# Patient Record
Sex: FEMALE | Race: WHITE | NOT HISPANIC OR LATINO | Employment: FULL TIME | URBAN - METROPOLITAN AREA
[De-identification: names, ages, dates, MRNs, and addresses within clinical notes are randomized per-mention and may not be internally consistent; named-entity substitution may affect disease eponyms.]

---

## 2017-03-07 ENCOUNTER — OFFICE VISIT (OUTPATIENT)
Dept: INTERNAL MEDICINE | Facility: CLINIC | Age: 32
End: 2017-03-07
Attending: INTERNAL MEDICINE
Payer: MEDICAID

## 2017-03-07 VITALS
DIASTOLIC BLOOD PRESSURE: 70 MMHG | HEIGHT: 65 IN | HEART RATE: 86 BPM | BODY MASS INDEX: 23.43 KG/M2 | SYSTOLIC BLOOD PRESSURE: 105 MMHG | TEMPERATURE: 99 F | WEIGHT: 140.63 LBS

## 2017-03-07 DIAGNOSIS — J06.9 VIRAL URI: Primary | ICD-10-CM

## 2017-03-07 DIAGNOSIS — Z3A.14 14 WEEKS GESTATION OF PREGNANCY: ICD-10-CM

## 2017-03-07 PROCEDURE — 99999 PR PBB SHADOW E&M-NEW PATIENT-LVL III: CPT | Mod: PBBFAC,,, | Performed by: STUDENT IN AN ORGANIZED HEALTH CARE EDUCATION/TRAINING PROGRAM

## 2017-03-07 PROCEDURE — 99203 OFFICE O/P NEW LOW 30 MIN: CPT | Mod: S$PBB,,, | Performed by: STUDENT IN AN ORGANIZED HEALTH CARE EDUCATION/TRAINING PROGRAM

## 2017-03-07 PROCEDURE — 99203 OFFICE O/P NEW LOW 30 MIN: CPT | Mod: PBBFAC,PO | Performed by: STUDENT IN AN ORGANIZED HEALTH CARE EDUCATION/TRAINING PROGRAM

## 2017-03-07 NOTE — PATIENT INSTRUCTIONS
Please take nasocort/flonase as instructed. Please inform your OB about medications for nausea.   Viral Upper Respiratory Illness (Adult)  You have a viral upper respiratory illness (URI), which is another term for the common cold. This illness is contagious during the first few days. It is spread through the air by coughing and sneezing. It may also be spread by direct contact (touching the sick person and then touching your own eyes, nose, or mouth). Frequent handwashing will decrease risk of spread. Most viral illnesses go away within 7 to 10 days with rest and simple home remedies. Sometimes the illness may last for several weeks. Antibiotics will not kill a virus, and they are generally not prescribed for this condition.    Home care  · If symptoms are severe, rest at home for the first 2 to 3 days. When you resume activity, don't let yourself get too tired.  · Avoid being exposed to cigarette smoke (yours or others).  · You may use acetaminophen or ibuprofen to control pain and fever, unless another medicine was prescribed. (Note: If you have chronic liver or kidney disease, have ever had a stomach ulcer or gastrointestinal bleeding, or are taking blood-thinning medicines, talk with your healthcare provider before using these medicines.) Aspirin should never be given to anyone under 18 years of age who is ill with a viral infection or fever. It may cause severe liver or brain damage.  · Your appetite may be poor, so a light diet is fine. Avoid dehydration by drinking 6 to 8 glasses of fluids per day (water, soft drinks, juices, tea, or soup). Extra fluids will help loosen secretions in the nose and lungs.  · Over-the-counter cold medicines will not shorten the length of time youre sick, but they may be helpful for the following symptoms: cough, sore throat, and nasal and sinus congestion. (Note: Do not use decongestants if you have high blood pressure.)  Follow-up care  Follow up with your healthcare  provider, or as advised.  When to seek medical advice  Call your healthcare provider right away if any of these occur:  · Cough with lots of colored sputum (mucus)  · Severe headache; face, neck, or ear pain  · Difficulty swallowing due to throat pain  · Fever of 100.4°F (38°C)  Call 911, or get immediate medical care  Call emergency services right away if any of these occur:  · Chest pain, shortness of breath, wheezing, or difficulty breathing  · Coughing up blood  · Inability to swallow due to throat pain  Date Last Reviewed: 9/13/2015  © 5970-8107 [employer]. [address] [place] [address]. All rights reserved. This information is not intended as a substitute for professional medical care. Always follow your healthcare professional's instructions.

## 2017-03-07 NOTE — PROGRESS NOTES
"Subjective:       Patient ID: Portia Gould is a 32 y.o. female.    Chief Complaint: Sinus Problem and Nausea    HPI       33 yo pregnant female with asthma  and hx of MVP who is here for urgent care visit for "sinus problem" and nausea.     Complains of 1 week hx of sinus pressure and tinnitus. States she has congestion when sleeping and sinus pressure that has not improved. States that her boyfriend has the same symptoms. Reports taking claritin D (for about 5 days) and a "sinus rinse" that did not improve her symptoms. Reports subjective "low grade" fevers at night and nausea (~14 weeks since finding out she was pregnant). Denies CP, SOB, abdominal pain, vomiting, myalgias. She also notes a ~1.5wk hx of R sided tinnitus that would last 15 seconds. Denies otalgia, discharge, dizziness, vertigo, or hearing loss.      Last saw a MD 6 months ago for sinusitis, who prescribed an antibiotic and nasal spray. Patient states that her symptoms gradually improved.     Asthma well controlled, has not use inhaler in months.     Review of Systems   Constitutional: Positive for fever (subjective low grade fevers). Negative for chills.   HENT: Positive for congestion, rhinorrhea, sinus pressure, sneezing and tinnitus (R ear ). Negative for dental problem, ear discharge, ear pain, hearing loss, postnasal drip, sore throat and trouble swallowing.    Eyes: Positive for itching. Negative for pain and visual disturbance.   Respiratory: Negative for cough and shortness of breath.    Cardiovascular: Negative for chest pain and palpitations.   Gastrointestinal: Positive for nausea. Negative for abdominal distention, blood in stool, diarrhea and vomiting.   Genitourinary: Negative for dysuria.   Musculoskeletal: Negative for arthralgias, back pain and myalgias.   Neurological: Positive for headaches (sinus). Negative for dizziness, weakness, light-headedness and numbness.       Objective:       Vitals:    03/07/17 1553   BP: 105/70 "   Pulse: 86   Temp: 98.7       Physical Exam   Constitutional: She is oriented to person, place, and time. She appears well-developed and well-nourished. No distress.   HENT:   Right Ear: Hearing, external ear and ear canal normal. No drainage. Tympanic membrane is not injected, not erythematous and not bulging. No hemotympanum.   Left Ear: Hearing, external ear and ear canal normal. No drainage. Tympanic membrane is not injected, not erythematous and not bulging. No hemotympanum.   Mouth/Throat: Oropharynx is clear and moist. No oropharyngeal exudate.   Frontal and maxillary tenderness with palpation   Eyes: Conjunctivae and EOM are normal. No scleral icterus.   Neck: No thyromegaly present.   Cardiovascular: Normal rate, regular rhythm and normal heart sounds.    Pulmonary/Chest: Effort normal and breath sounds normal. She has no wheezes.   Abdominal: Soft. Bowel sounds are normal. There is no tenderness.   Lymphadenopathy:     She has no cervical adenopathy.   Neurological: She is alert and oriented to person, place, and time.   Skin: Skin is warm and dry.       Assessment:       1. Viral URI    2. 14 weeks gestation of pregnancy        Plan:       31 yo pregnant female with asthma who presents to clinic for UC for sinusitis and nausea. Discussed most common etiology of sinusitis is viral. Recommended OTC flonase/nasocort for congestion and decongestant for sinus pressure (Claritin D). Instructed patient to discuss with her OB (seeing tomorrow) on what to take for morning sickness. Pt desires to transfer care to [facility] for OB needs.  Discussed with patient s/s of when to return to clinic/ED that includes but not limit to high fever, hearing loss, worsening symptoms, CP, and SOB. Pt voiced understanding.     Viral URI   -flonase/nasocort for congestion   -continue claritin D   -increase fluids and wash hands regularly    14 weeks gestation of pregnancy  -     Ambulatory Referral to Obstetrics /  Gynecology  - Pt desired to transfer care to [facility] for her OB needs.  - Pt stated she is seeing her OB tomorrow and will discuss what she can take for her nausea.    D/W Dr. Valladares.    RTC PRN.    Mariangel Bronson PGY2

## 2017-03-07 NOTE — MR AVS SNAPSHOT
[facility]  [address]  [facility] [address]  Phone: [phone]  Fax: [phone]                  Portia Gould   3/7/2017 2:45 PM   Office Visit    Description:  Female : 1985   Provider:  NYA Albarran   Department:  [facility]           Reason for Visit     Sinus Problem     Nausea           Diagnoses this Visit        Comments    Viral URI    -  Primary     14 weeks gestation of pregnancy                To Do List           Goals (5 Years of Data)     None      Follow-Up and Disposition     Return if symptoms worsen or fail to improve.      [facility]     [facility] -  Assistance  Registered nurses in the [facility] provide clinical advisement, health education, appointment booking, and other advisory services.  Call for this free service at [phone].             Medications           Message regarding Medications     Verify the changes and/or additions to your medication regime listed below are the same as discussed with your clinician today.  If any of these changes or additions are incorrect, please notify your healthcare provider.        STOP taking these medications     albuterol 90 mcg/actuation inhaler Inhale 1-2 puffs into the lungs every 6 (six) hours as needed for Wheezing.    fluticasone (FLONASE) 50 mcg/actuation nasal spray 2 sprays by Each Nare route once daily.    norethindrone-e.estradiol-iron (LO LOESTRIN FE) 1 mg-10 mcg(24) /10 mcg (2) Tab Take 1 tablet by mouth once daily.           Verify that the below list of medications is an accurate representation of the medications you are currently taking.  If none reported, the list may be blank. If incorrect, please contact your healthcare provider. Carry this list with you in case of emergency.           Current Medications            Clinical Reference Information           Your Vitals Were     BP Pulse Temp Height Weight BMI  "   97/62 (BP Location: Left arm, Patient Position: Sitting, BP Method: Automatic) 86 98.7 °F (37.1 °C) (Oral) 5' 5" (1.651 m) 63.8 kg (140 lb 10.5 oz) 23.41 kg/m2      Blood Pressure          Most Recent Value    BP  97/62      Allergies as of 3/7/2017     Doxycycline      Immunizations Administered on Date of Encounter - 3/7/2017     None      Orders Placed During Today's Visit      Normal Orders This Visit    Ambulatory Referral to Obstetrics / Gynecology       Instructions    Please take nasocort/flonase as instructed. Please inform your OB about medications for nausea.   Viral Upper Respiratory Illness (Adult)  You have a viral upper respiratory illness (URI), which is another term for the common cold. This illness is contagious during the first few days. It is spread through the air by coughing and sneezing. It may also be spread by direct contact (touching the sick person and then touching your own eyes, nose, or mouth). Frequent handwashing will decrease risk of spread. Most viral illnesses go away within 7 to 10 days with rest and simple home remedies. Sometimes the illness may last for several weeks. Antibiotics will not kill a virus, and they are generally not prescribed for this condition.    Home care  · If symptoms are severe, rest at home for the first 2 to 3 days. When you resume activity, don't let yourself get too tired.  · Avoid being exposed to cigarette smoke (yours or others).  · You may use acetaminophen or ibuprofen to control pain and fever, unless another medicine was prescribed. (Note: If you have chronic liver or kidney disease, have ever had a stomach ulcer or gastrointestinal bleeding, or are taking blood-thinning medicines, talk with your healthcare provider before using these medicines.) Aspirin should never be given to anyone under 18 years of age who is ill with a viral infection or fever. It may cause severe liver or brain damage.  · Your appetite may be poor, so a light diet is " fine. Avoid dehydration by drinking 6 to 8 glasses of fluids per day (water, soft drinks, juices, tea, or soup). Extra fluids will help loosen secretions in the nose and lungs.  · Over-the-counter cold medicines will not shorten the length of time youre sick, but they may be helpful for the following symptoms: cough, sore throat, and nasal and sinus congestion. (Note: Do not use decongestants if you have high blood pressure.)  Follow-up care  Follow up with your healthcare provider, or as advised.  When to seek medical advice  Call your healthcare provider right away if any of these occur:  · Cough with lots of colored sputum (mucus)  · Severe headache; face, neck, or ear pain  · Difficulty swallowing due to throat pain  · Fever of 100.4°F (38°C)  Call 911, or get immediate medical care  Call emergency services right away if any of these occur:  · Chest pain, shortness of breath, wheezing, or difficulty breathing  · Coughing up blood  · Inability to swallow due to throat pain  Date Last Reviewed: 9/13/2015  © 7071-2405 [employer]. [address] [place] [address]. All rights reserved. This information is not intended as a substitute for professional medical care. Always follow your healthcare professional's instructions.             Language Assistance Services     ATTENTION: Language assistance services are available, free of charge. Please call [phone].      ATENCIÓN: Si habla español, tiene a guerrero disposición servicios gratuitos de asistencia lingüística. Llame al [phone].     CHÚ Ý: N?u b?n nói Ti?ng Vi?t, có các d?ch v? h? tr? ngôn ng? mi?n phí dành cho b?n. G?i s? [phone].         [facility] complies with applicable Federal civil rights laws and does not discriminate on the basis of race, color, national origin, age, disability, or sex.

## 2017-03-08 NOTE — PROGRESS NOTES
I have been physically present during the critical or key portions of the service furnished by the resident and I have participated in the management of the patient.  Dr. Valladares

## 2019-07-03 ENCOUNTER — OFFICE VISIT (OUTPATIENT)
Dept: URGENT CARE | Facility: CLINIC | Age: 34
End: 2019-07-03
Payer: MEDICAID

## 2019-07-03 VITALS
SYSTOLIC BLOOD PRESSURE: 105 MMHG | OXYGEN SATURATION: 96 % | HEART RATE: 96 BPM | RESPIRATION RATE: 18 BRPM | BODY MASS INDEX: 23.32 KG/M2 | TEMPERATURE: 98 F | HEIGHT: 65 IN | DIASTOLIC BLOOD PRESSURE: 64 MMHG | WEIGHT: 140 LBS

## 2019-07-03 DIAGNOSIS — J02.9 SORE THROAT: ICD-10-CM

## 2019-07-03 DIAGNOSIS — J01.40 ACUTE NON-RECURRENT PANSINUSITIS: Primary | ICD-10-CM

## 2019-07-03 LAB
CTP QC/QA: YES
S PYO RRNA THROAT QL PROBE: NEGATIVE

## 2019-07-03 PROCEDURE — 99214 OFFICE O/P EST MOD 30 MIN: CPT | Mod: 25,S$GLB,, | Performed by: PHYSICIAN ASSISTANT

## 2019-07-03 PROCEDURE — 87880 STREP A ASSAY W/OPTIC: CPT | Mod: QW,S$GLB,, | Performed by: PHYSICIAN ASSISTANT

## 2019-07-03 PROCEDURE — 99214 PR OFFICE/OUTPT VISIT, EST, LEVL IV, 30-39 MIN: ICD-10-PCS | Mod: 25,S$GLB,, | Performed by: PHYSICIAN ASSISTANT

## 2019-07-03 PROCEDURE — 87880 POCT RAPID STREP A: ICD-10-PCS | Mod: QW,S$GLB,, | Performed by: PHYSICIAN ASSISTANT

## 2019-07-03 RX ORDER — BETAMETHASONE SODIUM PHOSPHATE AND BETAMETHASONE ACETATE 3; 3 MG/ML; MG/ML
6 INJECTION, SUSPENSION INTRA-ARTICULAR; INTRALESIONAL; INTRAMUSCULAR; SOFT TISSUE
Status: COMPLETED | OUTPATIENT
Start: 2019-07-03 | End: 2019-07-03

## 2019-07-03 RX ADMIN — BETAMETHASONE SODIUM PHOSPHATE AND BETAMETHASONE ACETATE 6 MG: 3; 3 INJECTION, SUSPENSION INTRA-ARTICULAR; INTRALESIONAL; INTRAMUSCULAR; SOFT TISSUE at 04:07

## 2019-07-03 NOTE — PROGRESS NOTES
"Subjective:       Patient ID: Portia Gould is a 34 y.o. female.    Vitals:  height is 5' 5" (1.651 m) and weight is 63.5 kg (140 lb). Her oral temperature is 98.3 °F (36.8 °C). Her blood pressure is 105/64 and her pulse is 96. Her respiration is 18 and oxygen saturation is 96%.     Chief Complaint: Sore Throat    Patient present with sore throat, fever, and congestion started one week ago. States that her roommates are curently sick but are getting over their illnesses. Patient has tried Clartin D, Mucinex, and Theraflu at night no relief. State that she has zyrtec and Flonase at home but haven't tried to use either of them.    Sore Throat    This is a new problem. The current episode started 1 to 4 weeks ago. The problem has been gradually worsening. The pain is at a severity of 8/10. The pain is severe. Associated symptoms include congestion, coughing, ear pain, headaches, a hoarse voice, a plugged ear sensation, swollen glands and trouble swallowing. Pertinent negatives include no shortness of breath, stridor or vomiting. The treatment provided no relief.       Constitution: Positive for chills, sweating and fever. Negative for fatigue.   HENT: Positive for ear pain, congestion, sore throat and trouble swallowing. Negative for sinus pain, sinus pressure and voice change.    Neck: Negative for painful lymph nodes.   Eyes: Negative for eye redness.   Respiratory: Positive for cough and sputum production. Negative for chest tightness, bloody sputum, COPD, shortness of breath, stridor, wheezing and asthma.    Gastrointestinal: Positive for nausea. Negative for vomiting.   Musculoskeletal: Negative for muscle ache.   Skin: Negative for rash.   Allergic/Immunologic: Positive for seasonal allergies. Negative for asthma.   Neurological: Positive for headaches.   Hematologic/Lymphatic: Negative for swollen lymph nodes.       Objective:      Physical Exam   Constitutional: She is oriented to person, place, and time. She " appears well-developed and well-nourished. She is cooperative.  Non-toxic appearance. She does not appear ill. No distress.   HENT:   Head: Normocephalic and atraumatic.   Right Ear: Hearing, tympanic membrane, external ear and ear canal normal.   Left Ear: Hearing, tympanic membrane, external ear and ear canal normal.   Nose: Mucosal edema present. No rhinorrhea or nasal deformity. No epistaxis. Right sinus exhibits maxillary sinus tenderness and frontal sinus tenderness. Left sinus exhibits maxillary sinus tenderness and frontal sinus tenderness.   Mouth/Throat: Uvula is midline and mucous membranes are normal. No trismus in the jaw. Normal dentition. No uvula swelling. Posterior oropharyngeal erythema present. Tonsils are 1+ on the right. Tonsils are 1+ on the left. No tonsillar exudate.       Eyes: Conjunctivae and lids are normal. No scleral icterus.   Sclera clear bilat   Neck: Trachea normal, full passive range of motion without pain and phonation normal. Neck supple.   Cardiovascular: Normal rate, regular rhythm, normal heart sounds, intact distal pulses and normal pulses.   Pulmonary/Chest: Effort normal and breath sounds normal. No respiratory distress.   Abdominal: Soft. Normal appearance and bowel sounds are normal. She exhibits no distension. There is no tenderness.   Musculoskeletal: Normal range of motion. She exhibits no edema or deformity.   Lymphadenopathy:        Head (right side): No submental, no submandibular, no tonsillar, no preauricular and no posterior auricular adenopathy present.        Head (left side): No submental, no submandibular, no tonsillar, no preauricular and no posterior auricular adenopathy present.     She has no cervical adenopathy.   Neurological: She is alert and oriented to person, place, and time. She exhibits normal muscle tone. Coordination normal.   Skin: Skin is warm, dry and intact. She is not diaphoretic. No pallor.   Psychiatric: She has a normal mood and affect.  Her speech is normal and behavior is normal. Judgment and thought content normal. Cognition and memory are normal.   Nursing note and vitals reviewed.        Assessment:       1. Acute non-recurrent pansinusitis    2. Sore throat        Plan:         Acute non-recurrent pansinusitis  -     betamethasone acetate-betamethasone sodium phosphate injection 6 mg    Sore throat  -     POCT rapid strep A      Patient Instructions   General Instructions for Viral URI:    You received a steroid shot today - this can elevate your blood pressure, elevate your blood sugar, water weight gain, nervous energy, redness to the face and dimpling of the skin where the shot goes in.     Below are suggestions for symptomatic relief:              -Tylenol every 4 hours OR ibuprofen every 6 hours as needed for pain/fever.              -Salt water gargles to soothe throat pain.              -Chloroseptic spray also helps to numb throat pain.              -Nasal saline spray reduces inflammation and dryness.              -Warm face compresses to help with facial sinus pain/pressure.              -Vicks vapor rub at night.              -Flonase OTC or Nasacort OTC for nasal congestion.              -Simple foods like chicken noodle soup.              -Delsym helps with coughing at night              -Zyrtec/Claritin during the day & Benadryl at night may help with allergies.                If you DO NOT have Hypertension or any history of palpitations, it is ok to take over the counter Sudafed or Mucinex D or Allegra-D or Claritin-D or Zyrtec-D.  If you do take one of the above, it is ok to combine that with plain over the counter Mucinex or Allegra or Claritin or Zyrtec. If, for example, you are taking Zyrtec -D, you can combine that with Mucinex, but not Mucinex-D.  If you are taking Mucinex-D, you can combine that with plain Allegra or Claritin or Zyrtec.   If you DO have Hypertension or palpitations, it is safe to take Coricidin HBP for  relief of sinus symptoms.     Please follow up with your primary care provider within 2-5 days if your signs and symptoms have not resolved or worsen.      If your condition worsens or fails to improve we recommend that you receive another evaluation at the emergency room immediately or contact your primary medical clinic to discuss your concerns.   You must understand that you have received an Urgent Care treatment only and that you may be released before all of your medical problems are known or treated. You, the patient, will arrange for follow up care as instructed.           Acute Sinusitis    Acute sinusitis is irritation and swelling of the sinuses. It is usually caused by a viral infection after a common cold. Your doctor can help you find relief.  What is acute sinusitis?  Sinuses are air-filled spaces in the skull behind the face. They are kept moist and clean by a lining of mucosa. Things such as pollen, smoke, and chemical fumes can irritate the mucosa. It can then swell up. As a response to irritation, the mucosa makes more mucus and other fluids. Tiny hairlike cilia cover the mucosa. Cilia help carry mucus toward the opening of the sinus. Too much mucus may cause the cilia to stop working. This blocks the sinus opening. A buildup of fluid in the sinuses then causes pain and pressure. It can also encourage bacteria to grow in the sinuses.  Common symptoms of acute sinusitis  You may have:  · Facial soreness pain  · Headache  · Fever  · Fluid draining in the back of the throat (postnasal drip)  · Congestion  · Drainage that is thick and colored, instead of clear  · Cough  Diagnosing acute sinusitis  Your doctor will ask about your symptoms and health history. He or she will look at your ear, nose, and throat. You usually won't need to have X-rays taken.    The doctor may take a sample of mucus to check for bacteria. If you have sinusitis that keeps coming back, you may need imaging tests such as X-rays  or CAT scans. This will help your doctor check for a structural problem that may be causing the infection.  Treating acute sinusitis  Treatment is aimed at unblocking the sinus opening and helping the cilia work again. You may need to take antihistamine and decongestant medicine. These can reduce inflammation and decrease the amount of fluid your sinuses make. If you have a bacterial infection, you will need to take antibiotic medicine for 10 to 14 days. Take this medicine until it is gone, even if you feel better.  Date Last Reviewed: 10/1/2016  © 6024-0176 [employer]. [address] [place] [address]. All rights reserved. This information is not intended as a substitute for professional medical care. Always follow your healthcare professional's instructions.

## 2019-07-03 NOTE — PATIENT INSTRUCTIONS
General Instructions for Viral URI:    You received a steroid shot today - this can elevate your blood pressure, elevate your blood sugar, water weight gain, nervous energy, redness to the face and dimpling of the skin where the shot goes in.     Below are suggestions for symptomatic relief:              -Tylenol every 4 hours OR ibuprofen every 6 hours as needed for pain/fever.              -Salt water gargles to soothe throat pain.              -Chloroseptic spray also helps to numb throat pain.              -Nasal saline spray reduces inflammation and dryness.              -Warm face compresses to help with facial sinus pain/pressure.              -Vicks vapor rub at night.              -Flonase OTC or Nasacort OTC for nasal congestion.              -Simple foods like chicken noodle soup.              -Delsym helps with coughing at night              -Zyrtec/Claritin during the day & Benadryl at night may help with allergies.                If you DO NOT have Hypertension or any history of palpitations, it is ok to take over the counter Sudafed or Mucinex D or Allegra-D or Claritin-D or Zyrtec-D.  If you do take one of the above, it is ok to combine that with plain over the counter Mucinex or Allegra or Claritin or Zyrtec. If, for example, you are taking Zyrtec -D, you can combine that with Mucinex, but not Mucinex-D.  If you are taking Mucinex-D, you can combine that with plain Allegra or Claritin or Zyrtec.   If you DO have Hypertension or palpitations, it is safe to take Coricidin HBP for relief of sinus symptoms.     Please follow up with your primary care provider within 2-5 days if your signs and symptoms have not resolved or worsen.      If your condition worsens or fails to improve we recommend that you receive another evaluation at the emergency room immediately or contact your primary medical clinic to discuss your concerns.   You must understand that you have received an Urgent Care treatment only and  that you may be released before all of your medical problems are known or treated. You, the patient, will arrange for follow up care as instructed.           Acute Sinusitis    Acute sinusitis is irritation and swelling of the sinuses. It is usually caused by a viral infection after a common cold. Your doctor can help you find relief.  What is acute sinusitis?  Sinuses are air-filled spaces in the skull behind the face. They are kept moist and clean by a lining of mucosa. Things such as pollen, smoke, and chemical fumes can irritate the mucosa. It can then swell up. As a response to irritation, the mucosa makes more mucus and other fluids. Tiny hairlike cilia cover the mucosa. Cilia help carry mucus toward the opening of the sinus. Too much mucus may cause the cilia to stop working. This blocks the sinus opening. A buildup of fluid in the sinuses then causes pain and pressure. It can also encourage bacteria to grow in the sinuses.  Common symptoms of acute sinusitis  You may have:  · Facial soreness pain  · Headache  · Fever  · Fluid draining in the back of the throat (postnasal drip)  · Congestion  · Drainage that is thick and colored, instead of clear  · Cough  Diagnosing acute sinusitis  Your doctor will ask about your symptoms and health history. He or she will look at your ear, nose, and throat. You usually won't need to have X-rays taken.    The doctor may take a sample of mucus to check for bacteria. If you have sinusitis that keeps coming back, you may need imaging tests such as X-rays or CAT scans. This will help your doctor check for a structural problem that may be causing the infection.  Treating acute sinusitis  Treatment is aimed at unblocking the sinus opening and helping the cilia work again. You may need to take antihistamine and decongestant medicine. These can reduce inflammation and decrease the amount of fluid your sinuses make. If you have a bacterial infection, you will need to take antibiotic  medicine for 10 to 14 days. Take this medicine until it is gone, even if you feel better.  Date Last Reviewed: 10/1/2016  © 6111-2938 [employer]. [address] [place] [address], [place] [address]. All rights reserved. This information is not intended as a substitute for professional medical care. Always follow your healthcare professional's instructions.

## 2019-11-02 ENCOUNTER — HOSPITAL ENCOUNTER (EMERGENCY)
Facility: OTHER | Age: 34
Discharge: HOME | End: 2019-11-02
Attending: EMERGENCY MEDICINE
Payer: MEDICAID

## 2019-11-02 VITALS
DIASTOLIC BLOOD PRESSURE: 70 MMHG | BODY MASS INDEX: 21.66 KG/M2 | HEIGHT: 65 IN | SYSTOLIC BLOOD PRESSURE: 122 MMHG | HEART RATE: 96 BPM | OXYGEN SATURATION: 99 % | WEIGHT: 130 LBS | RESPIRATION RATE: 16 BRPM | TEMPERATURE: 99 F

## 2019-11-02 DIAGNOSIS — R07.9 CHEST PAIN: ICD-10-CM

## 2019-11-02 DIAGNOSIS — R07.9 CHEST PAIN, UNSPECIFIED TYPE: Primary | ICD-10-CM

## 2019-11-02 LAB
ALBUMIN SERPL BCP-MCNC: 4.2 G/DL (ref 3.5–5.2)
ALP SERPL-CCNC: 57 U/L (ref 55–135)
ALT SERPL W/O P-5'-P-CCNC: 17 U/L (ref 10–44)
ANION GAP SERPL CALC-SCNC: 10 MMOL/L (ref 8–16)
AST SERPL-CCNC: 20 U/L (ref 10–40)
B-HCG UR QL: NEGATIVE
BASOPHILS # BLD AUTO: 0.06 K/UL (ref 0–0.2)
BASOPHILS NFR BLD: 0.6 % (ref 0–1.9)
BILIRUB SERPL-MCNC: 1 MG/DL (ref 0.1–1)
BNP SERPL-MCNC: <10 PG/ML (ref 0–99)
BUN SERPL-MCNC: 10 MG/DL (ref 6–20)
CALCIUM SERPL-MCNC: 9.5 MG/DL (ref 8.7–10.5)
CHLORIDE SERPL-SCNC: 106 MMOL/L (ref 95–110)
CO2 SERPL-SCNC: 22 MMOL/L (ref 23–29)
CREAT SERPL-MCNC: 0.8 MG/DL (ref 0.5–1.4)
CTP QC/QA: YES
DIFFERENTIAL METHOD: ABNORMAL
EOSINOPHIL # BLD AUTO: 0.1 K/UL (ref 0–0.5)
EOSINOPHIL NFR BLD: 1.1 % (ref 0–8)
ERYTHROCYTE [DISTWIDTH] IN BLOOD BY AUTOMATED COUNT: 12.5 % (ref 11.5–14.5)
EST. GFR  (AFRICAN AMERICAN): >60 ML/MIN/1.73 M^2
EST. GFR  (NON AFRICAN AMERICAN): >60 ML/MIN/1.73 M^2
GLUCOSE SERPL-MCNC: 74 MG/DL (ref 70–110)
HCT VFR BLD AUTO: 43.4 % (ref 37–48.5)
HGB BLD-MCNC: 14.4 G/DL (ref 12–16)
IMM GRANULOCYTES # BLD AUTO: 0.03 K/UL (ref 0–0.04)
IMM GRANULOCYTES NFR BLD AUTO: 0.3 % (ref 0–0.5)
LYMPHOCYTES # BLD AUTO: 2.4 K/UL (ref 1–4.8)
LYMPHOCYTES NFR BLD: 21.9 % (ref 18–48)
MCH RBC QN AUTO: 32.2 PG (ref 27–31)
MCHC RBC AUTO-ENTMCNC: 33.2 G/DL (ref 32–36)
MCV RBC AUTO: 97 FL (ref 82–98)
MONOCYTES # BLD AUTO: 0.7 K/UL (ref 0.3–1)
MONOCYTES NFR BLD: 6.8 % (ref 4–15)
NEUTROPHILS # BLD AUTO: 7.5 K/UL (ref 1.8–7.7)
NEUTROPHILS NFR BLD: 69.3 % (ref 38–73)
NRBC BLD-RTO: 0 /100 WBC
PLATELET # BLD AUTO: 394 K/UL (ref 150–350)
PMV BLD AUTO: 9.3 FL (ref 9.2–12.9)
POTASSIUM SERPL-SCNC: 3.8 MMOL/L (ref 3.5–5.1)
PROT SERPL-MCNC: 7.3 G/DL (ref 6–8.4)
RBC # BLD AUTO: 4.47 M/UL (ref 4–5.4)
SODIUM SERPL-SCNC: 138 MMOL/L (ref 136–145)
TROPONIN I SERPL DL<=0.01 NG/ML-MCNC: <0.006 NG/ML (ref 0–0.03)
WBC # BLD AUTO: 10.79 K/UL (ref 3.9–12.7)

## 2019-11-02 PROCEDURE — 81025 URINE PREGNANCY TEST: CPT | Performed by: EMERGENCY MEDICINE

## 2019-11-02 PROCEDURE — 63600175 PHARM REV CODE 636 W HCPCS: Performed by: EMERGENCY MEDICINE

## 2019-11-02 PROCEDURE — 93010 EKG 12-LEAD: ICD-10-PCS | Mod: ,,, | Performed by: INTERNAL MEDICINE

## 2019-11-02 PROCEDURE — 83880 ASSAY OF NATRIURETIC PEPTIDE: CPT

## 2019-11-02 PROCEDURE — 99285 EMERGENCY DEPT VISIT HI MDM: CPT | Mod: 25

## 2019-11-02 PROCEDURE — 84484 ASSAY OF TROPONIN QUANT: CPT

## 2019-11-02 PROCEDURE — 93005 ELECTROCARDIOGRAM TRACING: CPT

## 2019-11-02 PROCEDURE — 80053 COMPREHEN METABOLIC PANEL: CPT

## 2019-11-02 PROCEDURE — 25500020 PHARM REV CODE 255: Performed by: EMERGENCY MEDICINE

## 2019-11-02 PROCEDURE — 36415 COLL VENOUS BLD VENIPUNCTURE: CPT

## 2019-11-02 PROCEDURE — 96360 HYDRATION IV INFUSION INIT: CPT

## 2019-11-02 PROCEDURE — 93010 ELECTROCARDIOGRAM REPORT: CPT | Mod: ,,, | Performed by: INTERNAL MEDICINE

## 2019-11-02 PROCEDURE — 85025 COMPLETE CBC W/AUTO DIFF WBC: CPT

## 2019-11-02 RX ORDER — ETODOLAC 400 MG/1
400 TABLET, FILM COATED ORAL 2 TIMES DAILY PRN
Qty: 20 TABLET | Refills: 0 | Status: SHIPPED | OUTPATIENT
Start: 2019-11-02

## 2019-11-02 RX ORDER — CYCLOBENZAPRINE HCL 10 MG
10 TABLET ORAL 3 TIMES DAILY PRN
Qty: 30 TABLET | Refills: 0 | Status: SHIPPED | OUTPATIENT
Start: 2019-11-02 | End: 2019-11-12

## 2019-11-02 RX ORDER — NAPROXEN 500 MG/1
500 TABLET ORAL 2 TIMES DAILY
COMMUNITY
End: 2019-11-02

## 2019-11-02 RX ADMIN — IOHEXOL 75 ML: 350 INJECTION, SOLUTION INTRAVENOUS at 04:11

## 2019-11-02 RX ADMIN — SODIUM CHLORIDE 500 ML: 0.9 INJECTION, SOLUTION INTRAVENOUS at 03:11

## 2019-11-02 NOTE — ED TRIAGE NOTES
"Patient presents to ER with c/o Right sided chest pain and feeling dizzy after vaping an off brand of "Juul".  Patient reports symptoms started last night but she continued to vape.  Pt reports vaping the same brand this morning and then waking up to sharp Right sided chest pain.  Pain 10/10.  Patient also reports dizziness and having episodes of sob.  "

## 2019-11-02 NOTE — ED PROVIDER NOTES
"Encounter Date: 11/2/2019    SCRIBE #1 NOTE: Carine MARS am scribing for, and in the presence of, Dr. Mccoy.       History     Chief Complaint   Patient presents with    Allergic Reaction     pt was smoking jewel pack now feeling dizzy and axitey.     Time seen by provider: 3:08 PM    This is a 34 y.o. female with PMHx of Asthma who presents with complaint of sharp intermittent right sided chest pain that started today which woke her out of sleep. Patient says, " it feels like a shooting pain and its hard to breath," and her episodes only last for a few seconds at a time. She reports trying to use her asthma inhaler that she typically never needs but reports that her symptoms did not subside. Patient also reports chills. She denies any pain upon inhalation or with a deep breath. She denies fever, cough, leg swelling, nausea, or vomiting. She denies smoking cigarettes but admits to using JUUL Fe pink and drinking EtOH occasionally. She notes that she was smoking an "off brand JUUL pod" last night, however she didn't experience these current symptoms while doing so. She denies any recent travel. No BCP's and LNMP was 28 days ago, as she is expecting her cycle soon. Patient reports no other current complaints.    The history is provided by the patient.     Review of patient's allergies indicates:   Allergen Reactions    Doxycycline Nausea And Vomiting     Past Medical History:   Diagnosis Date    Allergy     Asthma     Mitral valve prolapse      Past Surgical History:   Procedure Laterality Date    Breast augmentation       Family History   Problem Relation Age of Onset    Heart disease Maternal Grandmother      Social History     Tobacco Use    Smoking status: Never Smoker    Smokeless tobacco: Never Used   Substance Use Topics    Alcohol use: No     Alcohol/week: 1.0 standard drinks     Types: 1 Glasses of wine per week    Drug use: No     Review of Systems   Constitutional: Positive for chills. "   Respiratory: Negative for cough, choking, chest tightness, shortness of breath and wheezing.    Cardiovascular: Positive for chest pain. Negative for palpitations and leg swelling.   Gastrointestinal: Negative for abdominal pain and nausea.   Genitourinary: Negative for flank pain.   Musculoskeletal: Negative for back pain.   All other systems reviewed and are negative.      Physical Exam     Initial Vitals [11/02/19 1420]   BP Pulse Resp Temp SpO2   115/64 91 20 97.8 °F (36.6 °C) 95 %      MAP       --         Physical Exam    Nursing note and vitals reviewed.  Constitutional: She appears well-developed and well-nourished. She is not diaphoretic. She is cooperative.  Non-toxic appearance. No distress.   HENT:   Head: Normocephalic and atraumatic.   Nose: Nose normal.   Mouth/Throat: Oropharynx is clear and moist.   Eyes: Conjunctivae and EOM are normal. Pupils are equal, round, and reactive to light. Right eye exhibits no discharge. Left eye exhibits no discharge.   Neck: Normal range of motion and full passive range of motion without pain. Neck supple. No thyromegaly present. No JVD present.   Cardiovascular: Normal rate, regular rhythm, normal heart sounds, intact distal pulses and normal pulses. Exam reveals no gallop and no friction rub.    No murmur heard.  Pulmonary/Chest: Effort normal and breath sounds normal. No respiratory distress. She has no wheezes. She has no rhonchi. She has no rales. She exhibits tenderness.   Right chest wall that partially reproduces pain     Abdominal: Soft. Normal appearance and bowel sounds are normal. She exhibits no distension and no mass. There is no tenderness. There is no rebound and no guarding.   Musculoskeletal: Normal range of motion. She exhibits no edema or tenderness.   Slight tenderness to palpation of right chest wall which partially reproduces pain. Left foot velcro splint in place.   Neurological: She is alert and oriented to person, place, and time. She has  normal strength and normal reflexes. She displays normal reflexes. No cranial nerve deficit or sensory deficit. GCS score is 15. GCS eye subscore is 4. GCS verbal subscore is 5. GCS motor subscore is 6.   Skin: Skin is warm, dry and intact. Capillary refill takes less than 2 seconds. No rash noted.   Psychiatric: Her speech is normal.   Anxious, cooperative         ED Course   Procedures  Labs Reviewed   CBC W/ AUTO DIFFERENTIAL - Abnormal; Notable for the following components:       Result Value    Mean Corpuscular Hemoglobin 32.2 (*)     Platelets 394 (*)     All other components within normal limits   COMPREHENSIVE METABOLIC PANEL - Abnormal; Notable for the following components:    CO2 22 (*)     All other components within normal limits   POCT URINE PREGNANCY - Normal   TROPONIN I   B-TYPE NATRIURETIC PEPTIDE   TROPONIN I     EKG Readings: (Independently Interpreted)   Normal sinus rhythm 77 bpm. No STEMI. P wave inverted in lead V1.        Imaging Results          CTA Chest Non-Coronary (PE Study) (Final result)  Result time 11/02/19 17:14:55    Final result by Rambo Richmond MD (11/02/19 17:14:55)                 Impression:      1. No acute pulmonary thromboembolism noting limitations above.      Electronically signed by: Rambo Richmond MD  Date:    11/02/2019  Time:    17:14             Narrative:    EXAMINATION:  CTA CHEST NON CORONARY    CLINICAL HISTORY:  Chest pain, acute, PE suspected, intermed prob, negative D-dimer;    TECHNIQUE:  Low dose axial images, sagittal and coronal reformations were obtained from the thoracic inlet to the lung bases following the IV administration of 75 mL of Omnipaque 350.  Contrast timing was optimized to evaluate the pulmonary arteries.  MIP images were performed.    COMPARISON:  None    FINDINGS:  The structures at the base of the neck are grossly unremarkable.  No significant mediastinal lymphadenopathy.  The heart is not enlarged.  No pericardial effusion.  The  thoracic aorta tapers normally.  The visualized portions of the spleen and liver are grossly unremarkable.    The airways are patent.  No large focal consolidation.  No pneumothorax.  No pleural effusion.    Bolus timing is suboptimal for the evaluation of pulmonary thromboembolism.  Allowing for this, no convincing pulmonary arterial filling defect to the level of the segmental arteries bilaterally to suggest pulmonary thromboembolism.  No significant axillary lymphadenopathy.    The osseous structures are grossly unremarkable.                               X-Ray Chest AP Portable (Final result)  Result time 11/02/19 16:02:55    Final result by Daneil Avina MD (11/02/19 16:02:55)                 Impression:      No acute abnormality.      Electronically signed by: Daniel Avina MD  Date:    11/02/2019  Time:    16:02             Narrative:    EXAMINATION:  XR CHEST AP PORTABLE    CLINICAL HISTORY:  Chest Pain;    TECHNIQUE:  Single frontal view of the chest was performed.    COMPARISON:  02/26/2014    FINDINGS:  The lungs are clear, with normal appearance of pulmonary vasculature and no pleural effusion or pneumothorax.    The cardiac silhouette is normal in size. The hilar and mediastinal contours are unremarkable.    Bones are intact.                              X-Rays:   Independently Interpreted Readings:   Chest X-Ray: Normal cardiac shadow. No pleural effusion. No pneumothorax.     Medical Decision Making:   History:   Old Medical Records: I decided to obtain old medical records.  Differential Diagnosis:   Acute coronary syndrome, aortic dissection, pulmonary embolism, tension pneumothorax, Pericardial tamponade, Mediastinitis,  esophageal rupture, valvular heart disease, pericarditis, pneumonia, esophageal spasm    Independently Interpreted Test(s):   I have ordered and independently interpreted X-rays - see prior notes.  I have ordered and independently interpreted EKG Reading(s) - see prior  notes  Clinical Tests:   Lab Tests: Ordered and Reviewed  Radiological Study: Ordered and Reviewed  ED Management:  Discussed with patient that since her chest pain is not pleuritic, she has a negative CT scan, she is not tachycardic and saturation is 99%, a believe she is not likely to have pulmonary embolism despite the limited study.  Smoking cessation discussed with patient for greater than 5 min.  Discussed with patient that her chest pain is likely either muscle spasms or musculoskeletal. Patient improved with treatment in the emergency department and is comfortable going home.  Educated the patient on warning signs and symptoms for which they must seek immediate medical attention.  I emphasized the importance of followup.  Patient understands that the emergency visit today is primarily to address immediate concerns and to rule out emergent cause of symptoms and that they may require further workup and evaluation as an outpatient. All questions addressed and patient given discharge instructions and followup information.               Scribe Attestation:   Scribe #1: I performed the above scribed service and the documentation accurately describes the services I performed. I attest to the accuracy of the note.    Attending Attestation:           Physician Attestation for Scribe:  Physician Attestation Statement for Scribe #1: I, Dr. Mccoy , reviewed documentation, as scribed by Carine Kiran  in my presence, and it is both accurate and complete.                    Clinical Impression:     1. Chest pain                                   José Mccoy MD  11/02/19 [id number]

## 2020-09-28 ENCOUNTER — OFFICE VISIT (OUTPATIENT)
Dept: URGENT CARE | Facility: CLINIC | Age: 35
End: 2020-09-28
Payer: MEDICAID

## 2020-09-28 VITALS
BODY MASS INDEX: 21.63 KG/M2 | DIASTOLIC BLOOD PRESSURE: 63 MMHG | WEIGHT: 130 LBS | HEART RATE: 79 BPM | TEMPERATURE: 98 F | OXYGEN SATURATION: 97 % | SYSTOLIC BLOOD PRESSURE: 117 MMHG

## 2020-09-28 DIAGNOSIS — M54.2 NECK PAIN: ICD-10-CM

## 2020-09-28 DIAGNOSIS — M62.838 MUSCLE SPASMS OF NECK: Primary | ICD-10-CM

## 2020-09-28 PROCEDURE — 72040 XR CERVICAL SPINE 2 OR 3 VIEWS: ICD-10-PCS | Mod: S$GLB,,, | Performed by: RADIOLOGY

## 2020-09-28 PROCEDURE — 99214 OFFICE O/P EST MOD 30 MIN: CPT | Mod: S$GLB,,, | Performed by: PHYSICIAN ASSISTANT

## 2020-09-28 PROCEDURE — 99214 PR OFFICE/OUTPT VISIT, EST, LEVL IV, 30-39 MIN: ICD-10-PCS | Mod: S$GLB,,, | Performed by: PHYSICIAN ASSISTANT

## 2020-09-28 PROCEDURE — 72040 X-RAY EXAM NECK SPINE 2-3 VW: CPT | Mod: S$GLB,,, | Performed by: RADIOLOGY

## 2020-09-28 RX ORDER — NAPROXEN 500 MG/1
500 TABLET ORAL 2 TIMES DAILY WITH MEALS
Qty: 20 TABLET | Refills: 0 | Status: SHIPPED | OUTPATIENT
Start: 2020-09-28 | End: 2020-10-08

## 2020-09-28 RX ORDER — KETOROLAC TROMETHAMINE 30 MG/ML
30 INJECTION, SOLUTION INTRAMUSCULAR; INTRAVENOUS
Status: COMPLETED | OUTPATIENT
Start: 2020-09-28 | End: 2020-09-28

## 2020-09-28 RX ORDER — METHOCARBAMOL 500 MG/1
500 TABLET, FILM COATED ORAL 3 TIMES DAILY PRN
Qty: 21 TABLET | Refills: 0 | Status: SHIPPED | OUTPATIENT
Start: 2020-09-28

## 2020-09-28 RX ADMIN — KETOROLAC TROMETHAMINE 30 MG: 30 INJECTION, SOLUTION INTRAMUSCULAR; INTRAVENOUS at 06:09

## 2020-09-28 NOTE — PROGRESS NOTES
Subjective:       Patient ID: Portia Gould is a 35 y.o. female.    Vitals:  weight is 59 kg (130 lb). Her temperature is 97.9 °F (36.6 °C). Her blood pressure is 117/63 and her pulse is 79. Her oxygen saturation is 97%.     Chief Complaint: Neck Pain    Mrs. Gould is a 34yo female who presents to the urgent care for evaluation with c/o neck pain/stiffness that started last night. She reports pain is mostly right sided and feels like a sharp stabbing pain. States that her neck feels tight when she tries to turn her head. Reports she is able to  perform ROM of neck, but it is extremely painful. Denies injury/trauma to the area. She denies fever, chills, headaches, changes in vision, numbness/tingling/weakness to extremities, abdominal pain, N/V/D, dizziness, confusion, AMS, LOC.  Reports taking 2 ibuprofens today ago but has not had any relief.    Neck Pain   This is a new problem. The current episode started yesterday. The problem occurs constantly. The problem has been unchanged. The quality of the pain is described as aching, shooting and stabbing. The pain is moderate. The pain is worse during the day. Stiffness is present all day. Pertinent negatives include no chest pain, fever, headaches, leg pain, numbness, pain with swallowing, paresis, photophobia, syncope, tingling, trouble swallowing, visual change, weakness or weight loss. Treatments tried: ibuprofen. The treatment provided no relief.       Constitution: Negative for chills, sweating, fatigue and fever.   HENT: Negative for ear pain, congestion, postnasal drip, sinus pain, sinus pressure, sore throat and trouble swallowing.    Neck: Positive for neck pain and neck stiffness. Negative for painful lymph nodes and neck swelling.   Cardiovascular: Negative for chest pain, leg swelling, palpitations and passing out.   Eyes: Negative for photophobia, double vision and blurred vision.   Respiratory: Negative for chest tightness, cough, sputum production,  shortness of breath, stridor and wheezing.    Gastrointestinal: Negative for abdominal pain, nausea, vomiting and diarrhea.   Genitourinary: Negative for dysuria, frequency, urgency, urine decreased, flank pain and history of kidney stones.   Musculoskeletal: Positive for joint pain. Negative for joint swelling, muscle cramps and muscle ache.   Skin: Negative for color change, pale, rash and bruising.   Allergic/Immunologic: Negative for seasonal allergies, itching and sneezing.   Neurological: Negative for dizziness, history of vertigo, light-headedness, passing out, headaches, disorientation, altered mental status, loss of consciousness, numbness and tingling.   Hematologic/Lymphatic: Negative for swollen lymph nodes.   Psychiatric/Behavioral: Negative for altered mental status, disorientation, confusion, nervous/anxious, sleep disturbance and depression. The patient is not nervous/anxious.        Objective:      Physical Exam   Constitutional: She is oriented to person, place, and time. She appears well-developed. She is cooperative.  Non-toxic appearance. She does not appear ill. No distress.      Comments:Patient is sitting pleasantly on exam table in no acute distress. Nontoxic appearing.    HENT:   Head: Normocephalic and atraumatic.   Nose: Nose normal. No rhinorrhea or congestion.   Mouth/Throat: Oropharynx is clear and moist and mucous membranes are normal.   Eyes: Pupils are equal, round, and reactive to light. Conjunctivae and lids are normal.   Neck: Trachea normal, normal range of motion, full passive range of motion without pain and phonation normal. Neck supple. Muscular tenderness present. No neck rigidity. No Brudzinski's sign and no Kernig's sign noted.   Cardiovascular: Normal rate, regular rhythm, normal heart sounds and normal pulses.   Pulmonary/Chest: Effort normal and breath sounds normal. No stridor. No respiratory distress. She has no wheezes. She has no rhonchi. She has no rales.    Abdominal: Soft. Normal appearance and bowel sounds are normal. She exhibits no abdominal bruit, no pulsatile midline mass and no mass.   Musculoskeletal:         General: Tenderness present. No swelling, deformity or signs of injury.      Cervical back: She exhibits tenderness, pain and spasm. She exhibits normal range of motion (able to perform full ROM, but ROM is painful), no swelling, no edema, no laceration and normal pulse.        Back:       Right lower leg: No edema.      Left lower leg: No edema.   Lymphadenopathy:     She has no cervical adenopathy.   Neurological: She is alert and oriented to person, place, and time. She has normal strength and normal reflexes. She displays no weakness. No sensory deficit.   Skin: Skin is warm, dry, intact and not diaphoretic. Psychiatric: Her speech is normal and behavior is normal. Mood, judgment and thought content normal.   Nursing note and vitals reviewed.        Xr Cervical Spine 2 Or 3 Views  Result Date: 9/28/2020  EXAMINATION: XR CERVICAL SPINE 2 OR 3 VIEWS CLINICAL HISTORY: Cervicalgia TECHNIQUE: AP, lateral and open mouth views of the cervical spine were performed. COMPARISON: None. FINDINGS: No evidence of acute cervical spine fracture or subluxation.  There is reversal of the normal cervical lordosis.  Odontoid process appears intact.  Mild intervertebral disc space narrowing is seen at the C5-6 level.  Surrounding soft tissues show no significant abnormalities.   No acute cervical spine abnormalities identified. Electronically signed by: Smita Mijares MD Date:    09/28/2020 Time:  18:40      Clinically well appearing, VSS. Clinical presentation consistent with muscle spasm of neck. Will start on trial of antiinflammatories and muscle relaxer. Discussed NSAIDs with patient. He/she denies having ever been told he/she had kidney disease/dysfunction. Pt denies having ever being told by a provider that he/she could not take NSAIDs due to any medical  conditions. Discussed risks of muscle relaxer with patient.  Advised on return/follow-up precautions. Advised on RED FLAG symptoms. Advised on ER precautions. Answered all patient questions. Patient verbalized understanding and voiced agreement with current treatment plan.        Assessment:       1. Muscle spasms of neck    2. Neck pain        Plan:         Muscle spasms of neck  -     methocarbamoL (ROBAXIN) 500 MG Tab; Take 1 tablet (500 mg total) by mouth 3 (three) times daily as needed. As needed for muscle spasm. May cause drowsiness, use especially at bedtime  Dispense: 21 tablet; Refill: 0  -     ketorolac injection 30 mg  -     naproxen (NAPROSYN) 500 MG tablet; Take 1 tablet (500 mg total) by mouth 2 (two) times daily with meals. for 10 days  Dispense: 20 tablet; Refill: 0    Neck pain  -     XR Cervical Spine 2 or 3 Views; Future; Expected date: 09/28/2020  -     methocarbamoL (ROBAXIN) 500 MG Tab; Take 1 tablet (500 mg total) by mouth 3 (three) times daily as needed. As needed for muscle spasm. May cause drowsiness, use especially at bedtime  Dispense: 21 tablet; Refill: 0  -     ketorolac injection 30 mg  -     naproxen (NAPROSYN) 500 MG tablet; Take 1 tablet (500 mg total) by mouth 2 (two) times daily with meals. for 10 days  Dispense: 20 tablet; Refill: 0      Patient Instructions     Alternate heat and ice for first 48 hours then  apply heat. You may do gently stretching if tolerable.    Moist warm compresss to area several times daily.  May use a heating pad on LOW to provide heat over a towel which was dampended with warm water. DO NOT FALL ASLEEP WITH HEATING PAD ON.  Do not stay in one position to long.  When sleeping on your back place a pillow under knees to reduce tension on back.  If sleeping on your side, place pillow between knees to keep spine in better alinement.  Wear supportive shoes such as tennis shoes for support of the lower back.       - Begin your anti- inflammatory (naprosyn) on  09/29/2020. Take with meals. DO NOT take additional over the counter antiinflammatories while taking this medication.   You can take the Muscle Relaxant (robaxin) on today as prescribed.   The medication you have been prescribed may cause drowsiness and impair your judgement.  Therefore, you should avoid driving, climbing, using machinery, etc., so as not to increase your risk of injury.  Do NOT drink any alcohol while on this medication(s).       Follow up with your PCP in the next 3-5 days or sooner if no improvement.      If your condition worsens or fails to improve we recommend that you receive another evaluation at the ER immediately or contact your PCP to discuss your concerns or return here. You must understand that you've received an urgent care treatment only and that you may be released before all your medical problems are known or treated. You the patient will arrange for followup care as instructed.           Neck Spasm     A spasm of the neck muscles can happen after a sudden awkward neck movement. Sleeping with your neck in a crooked position can also cause spasm. Some people respond to emotional stress by tensing the muscles of their neck, shoulders, and upper back. If neck spasm lasts long enough, it can cause headache.  The treatment described below will usually help the pain to go away in 5 to 7 days. Pain that continues may need further evaluation or other types of treatment such as physical therapy.  Home care  · Rest and relax the muscles. Use a comfortable pillow that supports the head and keeps the spine in a neutral position. The position of the head should not be tilted forward or backward. A rolled up towel may help for a custom fit.  · Some people find relief with heat. Heat can be applied with either a warm shower or bath or a moist towel heated in the microwave and massage. Others prefer cold packs. You can make an ice pack by filling a plastic bag that seals at the top with ice cubes  or crushed ice and then wrapping it with a thin towel. Try both and use the method that feels best for 15 to 20 minutes, several times a day.  · Whether using ice or heat, be careful that you do not injure your skin. Never put ice directly on the skin. Always wrap the ice in a towel or other type of cloth. This is very important, especially in people with poor skin sensations.  · Try to reduce your stress level. Emotional stress can lead to neck muscle tension and get in the way of or delay the healing process.  · You may use over-the-counter pain medicine to control pain, unless another medicine was prescribed.If you have chronic liver or kidney disease or ever had a stomach ulcer or GI bleeding, talk with your healthcare provider before using these medicines.  Follow-up care  Follow up with your healthcare provider if your symptoms do not show signs of improvement after one week. Physical therapy or further tests may be needed.  If X-rays, CT scans, or MRI scans were taken, you will be told of any new findings that may affect your care.  Call 911  Call 911 if you have:  · Sudden weakness or numbness in one or both arms  · Neck swelling, difficulty or painful swallowing  · Difficulty breathing  · Chest pain  When to seek medical advice  Call your healthcare provider right away if any of these occur:  · Pain becomes worse or spreads into one or both arms  · Increasing headache with nausea or vomiting  · Fever of 100.4°F (38°C) or above lasting for 24 to 48 hours  Date Last Reviewed: 11/21/2015  © 8589-9732 [employer] [address] [place] [address], [place] [address]. All rights reserved. This information is not intended as a substitute for professional medical care. Always follow your healthcare professional's instructions.

## 2020-09-28 NOTE — PATIENT INSTRUCTIONS
Alternate heat and ice for first 48 hours then  apply heat. You may do gently stretching if tolerable.    Moist warm compresss to area several times daily.  May use a heating pad on LOW to provide heat over a towel which was dampended with warm water. DO NOT FALL ASLEEP WITH HEATING PAD ON.  Do not stay in one position to long.  When sleeping on your back place a pillow under knees to reduce tension on back.  If sleeping on your side, place pillow between knees to keep spine in better alinement.  Wear supportive shoes such as tennis shoes for support of the lower back.       - Begin your anti- inflammatory (naprosyn) on 09/29/2020. Take with meals. DO NOT take additional over the counter antiinflammatories while taking this medication.   You can take the Muscle Relaxant (robaxin) on today as prescribed.   The medication you have been prescribed may cause drowsiness and impair your judgement.  Therefore, you should avoid driving, climbing, using machinery, etc., so as not to increase your risk of injury.  Do NOT drink any alcohol while on this medication(s).       Follow up with your PCP in the next 3-5 days or sooner if no improvement.      If your condition worsens or fails to improve we recommend that you receive another evaluation at the ER immediately or contact your PCP to discuss your concerns or return here. You must understand that you've received an urgent care treatment only and that you may be released before all your medical problems are known or treated. You the patient will arrange for followup care as instructed.           Neck Spasm     A spasm of the neck muscles can happen after a sudden awkward neck movement. Sleeping with your neck in a crooked position can also cause spasm. Some people respond to emotional stress by tensing the muscles of their neck, shoulders, and upper back. If neck spasm lasts long enough, it can cause headache.  The treatment described below will usually help the pain to  go away in 5 to 7 days. Pain that continues may need further evaluation or other types of treatment such as physical therapy.  Home care  · Rest and relax the muscles. Use a comfortable pillow that supports the head and keeps the spine in a neutral position. The position of the head should not be tilted forward or backward. A rolled up towel may help for a custom fit.  · Some people find relief with heat. Heat can be applied with either a warm shower or bath or a moist towel heated in the microwave and massage. Others prefer cold packs. You can make an ice pack by filling a plastic bag that seals at the top with ice cubes or crushed ice and then wrapping it with a thin towel. Try both and use the method that feels best for 15 to 20 minutes, several times a day.  · Whether using ice or heat, be careful that you do not injure your skin. Never put ice directly on the skin. Always wrap the ice in a towel or other type of cloth. This is very important, especially in people with poor skin sensations.  · Try to reduce your stress level. Emotional stress can lead to neck muscle tension and get in the way of or delay the healing process.  · You may use over-the-counter pain medicine to control pain, unless another medicine was prescribed.If you have chronic liver or kidney disease or ever had a stomach ulcer or GI bleeding, talk with your healthcare provider before using these medicines.  Follow-up care  Follow up with your healthcare provider if your symptoms do not show signs of improvement after one week. Physical therapy or further tests may be needed.  If X-rays, CT scans, or MRI scans were taken, you will be told of any new findings that may affect your care.  Call 911  Call 911 if you have:  · Sudden weakness or numbness in one or both arms  · Neck swelling, difficulty or painful swallowing  · Difficulty breathing  · Chest pain  When to seek medical advice  Call your healthcare provider right away if any of these  occur:  · Pain becomes worse or spreads into one or both arms  · Increasing headache with nausea or vomiting  · Fever of 100.4°F (38°C) or above lasting for 24 to 48 hours  Date Last Reviewed: 11/21/2015  © 6964-9302 [employer]. [address] [place] [address]. All rights reserved. This information is not intended as a substitute for professional medical care. Always follow your healthcare professional's instructions.

## 2021-09-23 PROBLEM — M54.50 LUMBAGO: Status: ACTIVE | Noted: 2021-09-23

## 2021-09-23 PROBLEM — M54.6 PAIN IN THORACIC SPINE: Status: ACTIVE | Noted: 2021-09-23

## 2021-09-23 PROBLEM — M54.2 CERVICALGIA: Status: ACTIVE | Noted: 2021-09-23
